# Patient Record
Sex: FEMALE | Race: BLACK OR AFRICAN AMERICAN | NOT HISPANIC OR LATINO | URBAN - METROPOLITAN AREA
[De-identification: names, ages, dates, MRNs, and addresses within clinical notes are randomized per-mention and may not be internally consistent; named-entity substitution may affect disease eponyms.]

---

## 2021-07-21 ENCOUNTER — OUTPATIENT (OUTPATIENT)
Dept: OUTPATIENT SERVICES | Facility: HOSPITAL | Age: 71
LOS: 1 days | End: 2021-07-21
Payer: COMMERCIAL

## 2021-07-21 ENCOUNTER — RESULT REVIEW (OUTPATIENT)
Age: 71
End: 2021-07-21

## 2021-07-21 ENCOUNTER — APPOINTMENT (OUTPATIENT)
Dept: MRI IMAGING | Facility: HOSPITAL | Age: 71
End: 2021-07-21

## 2021-07-21 DIAGNOSIS — Z98.89 OTHER SPECIFIED POSTPROCEDURAL STATES: Chronic | ICD-10-CM

## 2021-07-21 PROCEDURE — 70544 MR ANGIOGRAPHY HEAD W/O DYE: CPT | Mod: 26

## 2021-07-21 PROCEDURE — 70544 MR ANGIOGRAPHY HEAD W/O DYE: CPT

## 2022-01-04 ENCOUNTER — TRANSCRIPTION ENCOUNTER (OUTPATIENT)
Age: 72
End: 2022-01-04

## 2022-12-12 ENCOUNTER — NON-APPOINTMENT (OUTPATIENT)
Age: 72
End: 2022-12-12

## 2023-06-05 ENCOUNTER — APPOINTMENT (OUTPATIENT)
Dept: ORTHOPEDIC SURGERY | Facility: CLINIC | Age: 73
End: 2023-06-05
Payer: COMMERCIAL

## 2023-06-05 VITALS — WEIGHT: 139 LBS | HEIGHT: 68 IN | BODY MASS INDEX: 21.07 KG/M2

## 2023-06-05 DIAGNOSIS — M77.8 OTHER ENTHESOPATHIES, NOT ELSEWHERE CLASSIFIED: ICD-10-CM

## 2023-06-05 PROCEDURE — 99203 OFFICE O/P NEW LOW 30 MIN: CPT

## 2023-06-05 NOTE — PHYSICAL EXAM
[Sitting] : sitting [Moderate] : moderate [4___] : external rotation 4[unfilled]/5 [5___] : internal rotation 5[unfilled]/5 [Right] : right shoulder [There are no fractures, subluxations or dislocations. No significant abnormalities are seen] : There are no fractures, subluxations or dislocations. No significant abnormalities are seen [] : no scapular winging [Outside films reviewed] : Outside films reviewed [Degenerative change] : Degenerative change [TWNoteComboBox7] : active forward flexion 165 degrees [TWNoteComboBox4] : passive forward flexion 180 degrees [TWNoteComboBox6] : internal rotation L4 [de-identified] : external rotation 70 degrees

## 2023-06-05 NOTE — DISCUSSION/SUMMARY
[de-identified] : Patient allowed to gently start resuming activities. \par Discussed change to medication prescription and usage. \par Offered cortisone steroid injection. \par PT discussed.\par try flori patch\par 06/05/2023 \par \par  RE:  CORTNEY MALONE \par \par Acct #- [id number] \par \par \par Attention:  Nurse Reviewer /Medical Director\par \par I am writing this letter as a medical necessity for PT program.\par Patient has tried analgesics, non-steroid anti-inflammatory agents, \par hot or cold compresses,injections of corticosteroids, etc)  which in combination or by themselves has not worked.\par Based on my patient's condition, I strongly believe that the PT is medically needed.\par  \par Thank you for your time and consideration.   \par \par MRI right shoulder eval for cuff tear. \par \par \par RE:  CORTNEY MALONE \par \par Acct #- [id number] \par \par Attention:  Nurse Reviewer /Medical Director\par \par  \par Based on my patient's condition, I strongly believe that the MRI is medically.necessary.  \par The patient has failed oral meds, injections and PT and conservative treatment in combination or by themselves and therefore needs the MRI.  \par The MRI will dictate further treatment t recommendations.\par

## 2023-06-05 NOTE — HISTORY OF PRESENT ILLNESS
[Gradual] : gradual [10] : 10 [7] : 7 [Dull/Aching] : dull/aching [Sharp] : sharp [Stabbing] : stabbing [Frequent] : frequent [Leisure] : leisure [Rest] : rest [Exercising] : exercising [de-identified] : 73 year old RHD female with pain in the right shoulder, symptoms started a couple months ago, no specific injury. PAin with reaching over head, behind back and trouble sleeping at night. No radicular complaints. She did see her PCP, underwent Xray and referred for consultation. She feels it is secondary to more repetitive motions. She had an injury to the shoulder about ten years ago and was told she may have a torn RTC at that time, completed HEP and symptoms improved.  [] : Post Surgical Visit: no [FreeTextEntry1] : right shoulder  [FreeTextEntry5] : pt is here today for right shoulder pain,  [FreeTextEntry7] : down to the bicep [de-identified] : movement [de-identified] : [facility] [de-identified] : xrays

## 2023-07-13 ENCOUNTER — RX RENEWAL (OUTPATIENT)
Age: 73
End: 2023-07-13

## 2023-07-13 RX ORDER — MELOXICAM 15 MG/1
15 TABLET ORAL
Qty: 30 | Refills: 0 | Status: ACTIVE | COMMUNITY
Start: 2023-06-05 | End: 1900-01-01

## 2023-07-17 ENCOUNTER — APPOINTMENT (OUTPATIENT)
Dept: ORTHOPEDIC SURGERY | Facility: CLINIC | Age: 73
End: 2023-07-17

## 2023-09-06 ENCOUNTER — APPOINTMENT (OUTPATIENT)
Dept: ORTHOPEDIC SURGERY | Facility: CLINIC | Age: 73
End: 2023-09-06
Payer: COMMERCIAL

## 2023-09-06 ENCOUNTER — NON-APPOINTMENT (OUTPATIENT)
Age: 73
End: 2023-09-06

## 2023-09-06 VITALS — HEIGHT: 68 IN | WEIGHT: 139 LBS | BODY MASS INDEX: 21.07 KG/M2

## 2023-09-06 PROCEDURE — 73030 X-RAY EXAM OF SHOULDER: CPT | Mod: RT

## 2023-09-06 PROCEDURE — 99213 OFFICE O/P EST LOW 20 MIN: CPT

## 2023-09-06 NOTE — HISTORY OF PRESENT ILLNESS
[Sudden] : sudden [5] : 5 [2] : 2 [Dull/Aching] : dull/aching [Constant] : constant [Household chores] : household chores [Leisure] : leisure [Nothing helps with pain getting better] : Nothing helps with pain getting better [de-identified] : 73 year old RHD female with pain in the right shoulder, she fell down the basement stairs (ten stairs) and injured the right shoulder. She was taken via ambulance to [place] [facility], underwent reduction of anterior shoulder dislocation and placed into a sling. Pain has been slowly improving. She has a history of OA right shoulder and tendinitis, she was attending PT program with help and she was improving prior to this incident.  [] : This patient has had an injection before: no [FreeTextEntry1] : right shoulder [FreeTextEntry5] : pt fell on 9/1/23 and injured the shoulder. Pt states that she went to the ER and the shoulder was dislocated. [de-identified] : motion/pressure [de-identified] : [facility] [de-identified] : 9/1/23

## 2023-09-06 NOTE — PHYSICAL EXAM
[Sitting] : sitting [Mild] : mild [5___] : internal rotation 5[unfilled]/5 [Right] : right shoulder [There are no fractures, subluxations or dislocations. No significant abnormalities are seen] : There are no fractures, subluxations or dislocations. No significant abnormalities are seen [] : tenderness to palpation [Degenerative change] : Degenerative change [TWNoteComboBox7] : active forward flexion 90 degrees

## 2023-09-06 NOTE — DISCUSSION/SUMMARY
August 28, 2017    Patient: Wilmar Mckeon   Date of Visit: 8/28/2017       To Whom It May Concern:    Wilmar Mckeon was seen and treated in our emergency department on 8/28/2017. He should not return to work until Thursday 8/31.     If you have any qu [de-identified] : restrict activities use sling try OTC meds ice as needed  OOW until 9/18, she does not perform physical work poss pT after next visit

## 2023-09-06 NOTE — RETURN TO WORK/SCHOOL
[Work] : work [___ Weeks] : I will re-evaluate the patient in [unfilled] week(s), at which time I will provide an update to their current status [Return Date: _____] : as of [unfilled].  This has been discussed in detail with ~Willy~ and ~he/she~ understands this. [Full Duty] : full duty

## 2023-09-27 ENCOUNTER — APPOINTMENT (OUTPATIENT)
Dept: ORTHOPEDIC SURGERY | Facility: CLINIC | Age: 73
End: 2023-09-27
Payer: COMMERCIAL

## 2023-09-27 VITALS — HEIGHT: 68 IN | BODY MASS INDEX: 21.07 KG/M2 | WEIGHT: 139 LBS

## 2023-09-27 DIAGNOSIS — S43.014A ANTERIOR DISLOCATION OF RIGHT HUMERUS, INITIAL ENCOUNTER: ICD-10-CM

## 2023-09-27 DIAGNOSIS — M19.011 PRIMARY OSTEOARTHRITIS, RIGHT SHOULDER: ICD-10-CM

## 2023-09-27 DIAGNOSIS — S46.011D STRAIN OF MUSCLE(S) AND TENDON(S) OF THE ROTATOR CUFF OF RIGHT SHOULDER, SUBSEQUENT ENCOUNTER: ICD-10-CM

## 2023-09-27 PROCEDURE — 99213 OFFICE O/P EST LOW 20 MIN: CPT

## 2023-10-25 ENCOUNTER — APPOINTMENT (OUTPATIENT)
Dept: ORTHOPEDIC SURGERY | Facility: CLINIC | Age: 73
End: 2023-10-25

## 2024-03-13 ENCOUNTER — EMERGENCY (EMERGENCY)
Facility: HOSPITAL | Age: 74
LOS: 1 days | Discharge: HOME | End: 2024-03-13
Attending: STUDENT IN AN ORGANIZED HEALTH CARE EDUCATION/TRAINING PROGRAM | Admitting: STUDENT IN AN ORGANIZED HEALTH CARE EDUCATION/TRAINING PROGRAM
Payer: COMMERCIAL

## 2024-03-13 VITALS
OXYGEN SATURATION: 98 % | HEART RATE: 81 BPM | WEIGHT: 139.99 LBS | SYSTOLIC BLOOD PRESSURE: 117 MMHG | RESPIRATION RATE: 20 BRPM | DIASTOLIC BLOOD PRESSURE: 67 MMHG | HEIGHT: 67 IN | TEMPERATURE: 98 F

## 2024-03-13 DIAGNOSIS — Z98.89 OTHER SPECIFIED POSTPROCEDURAL STATES: Chronic | ICD-10-CM

## 2024-03-13 DIAGNOSIS — M79.602 PAIN IN LEFT ARM: ICD-10-CM

## 2024-03-13 DIAGNOSIS — R29.898 OTHER SYMPTOMS AND SIGNS INVOLVING THE MUSCULOSKELETAL SYSTEM: ICD-10-CM

## 2024-03-13 DIAGNOSIS — Z91.013 ALLERGY TO SEAFOOD: ICD-10-CM

## 2024-03-13 DIAGNOSIS — E78.5 HYPERLIPIDEMIA, UNSPECIFIED: ICD-10-CM

## 2024-03-13 DIAGNOSIS — R20.2 PARESTHESIA OF SKIN: ICD-10-CM

## 2024-03-13 DIAGNOSIS — I10 ESSENTIAL (PRIMARY) HYPERTENSION: ICD-10-CM

## 2024-03-13 DIAGNOSIS — M79.601 PAIN IN RIGHT ARM: ICD-10-CM

## 2024-03-13 PROCEDURE — 99284 EMERGENCY DEPT VISIT MOD MDM: CPT

## 2024-03-13 PROCEDURE — 99283 EMERGENCY DEPT VISIT LOW MDM: CPT | Mod: 25

## 2024-03-13 PROCEDURE — 72040 X-RAY EXAM NECK SPINE 2-3 VW: CPT

## 2024-03-13 PROCEDURE — 72040 X-RAY EXAM NECK SPINE 2-3 VW: CPT | Mod: 26

## 2024-03-13 NOTE — ED ADULT NURSE NOTE - OTHER COMPLAINTS
pt arrived to the ER- being told by [facility] radiology to come in for xray of spine due to b/a hand numbness. Pt states this has been going on for a year. Pt denies dizziness, vision changes, weakness and any other medical complaints. Pt is noted to be well appearing, in no acute distress, able to maintain airway, having nonlabored breathing, no droop or slur speech present, mobile with steady gait and able to talk in clear full sentences.

## 2024-03-13 NOTE — ED PROVIDER NOTE - PATIENT PORTAL LINK FT
You can access the FollowMyHealth Patient Portal offered by [facility] by registering at the following website: [email or web address]. By joining [employer]’s FollowMyHealth portal, you will also be able to view your health information using other applications (apps) compatible with our system.

## 2024-03-13 NOTE — ED PROVIDER NOTE - NSFOLLOWUPINSTRUCTIONS_ED_ALL_ED_FT
You were seen in the Emergency Department for bilateral hand numbness and pain. You had a cervical spine XRAY, as requested by your primary physician. Please take tylenol or motrin as needed for pain. Please return for worsening symptoms, worsening pain, numbness, weakness, neck pain, chest pain. Otherwise, please follow up closely with your primary physician.    I hope you feel better soon!    Sincerely,  Jan Proctor MD

## 2024-03-13 NOTE — ED ADULT TRIAGE NOTE - OTHER COMPLAINTS
pt arrived to the ER- being told by [facility] radiology to come in for xray of spine due to b/a hand numbness. Pt states this has been going on for a year. Pt denies dizziness, vision changes, weakness and any other medical complaints. Pt is noted to be well appearing, in no acute distress, able to maintain airway, having nonlabored breathing, no droop or slur speech present, mobile with steady gait and able to talk in clear full sentences.

## 2024-03-13 NOTE — ED ADULT NURSE NOTE - OBJECTIVE STATEMENT
74y F presents to ED c/o BL hand numbness/pain. Sent from [facility] ED outpt radiology for XR of spine. Reports recent dx pre diabetes. ENdorses bl hand numbness/tingling, pain when grasping objects x1year. Denies fall/trauma/injury, vision changes, loss of bowel/bladder control, focal weakness, other acute sx at this time. Pt AAOx4, speaking in full and clear sentences, NAD at this time. Ambulatory with steady gait on arrival.

## 2024-03-13 NOTE — ED PROVIDER NOTE - NS ED ROS FT
Constitutional: No fever or chills  Eyes: No discharge or drainage  Ears, Nose, Mouth, Throat: No nasal discharge, no sore throat  Cardiovascular: No chest pain, no palpitations  Respiratory: No shortness of breath, no cough  Gastrointestinal: No nausea or vomiting, no abdominal pain, no diarrhea or constipation  Musculoskeletal: No joint pain, no swelling  Skin: No rashes or lesions  Neurological: +numbness, weakness, tingling, no headache  Psychiatric: No depression

## 2024-03-13 NOTE — ED ADULT NURSE NOTE - NSFALLUNIVINTERV_ED_ALL_ED
Bed/Stretcher in lowest position, wheels locked, appropriate side rails in place/Call bell, personal items and telephone in reach/Instruct patient to call for assistance before getting out of bed/chair/stretcher/Non-slip footwear applied when patient is off stretcher/[place] to call system/Physically safe environment - no spills, clutter or unnecessary equipment/Purposeful proactive rounding/Room/bathroom lighting operational, light cord in reach

## 2024-03-13 NOTE — ED PROVIDER NOTE - OBJECTIVE STATEMENT
74 year old F with HTN, HLD, MCA aneursym sp clipping presenting for cervical spine XR. Pt with one year of intermittent bilateral hand numbness and tingling with associated pain. NO neck pain. No new numbness, weakness, tingling. No cp or sob. No neck pain. Pt saw PCP who ordered cervical spine XR. Pt went to [facility] radiology, closed at 4:30 PM, came here for imaging. No other acute complaints. Atraumatic. ROS as above.

## 2024-03-13 NOTE — ED PROVIDER NOTE - PHYSICAL EXAMINATION
general: Well appearing, in no acute distress  HEENT: Normocephalic, atraumatic, extraocular movements intact  CV: Regular rate  Pulm: No respiratory distress, no tachypnea  Abd: Flat, no gross distension  Ext: warm and well perfused, 2+ radial pulses  Skin: No gross rashes or lesions  Neuro: Alert and oriented, moving all extremities, 5/5 motor bilaterally, sensation intact bilaterally

## 2024-03-13 NOTE — ED PROVIDER NOTE - CLINICAL SUMMARY MEDICAL DECISION MAKING FREE TEXT BOX
75 yo f with 1 year of intermittent bilateral hand numbness and pain, suspect neuropathy, no neck pain, neuro exam nonfocal, symmetric pulses. requesting cervical spine xr by pcp, will order. given reassurance, pcp fu